# Patient Record
Sex: FEMALE | Race: WHITE | Employment: FULL TIME | URBAN - METROPOLITAN AREA
[De-identification: names, ages, dates, MRNs, and addresses within clinical notes are randomized per-mention and may not be internally consistent; named-entity substitution may affect disease eponyms.]

---

## 2020-05-06 ENCOUNTER — APPOINTMENT (OUTPATIENT)
Dept: CT IMAGING | Age: 40
End: 2020-05-06
Attending: FAMILY MEDICINE
Payer: COMMERCIAL

## 2020-05-06 ENCOUNTER — HOSPITAL ENCOUNTER (OUTPATIENT)
Age: 40
Discharge: HOME | End: 2020-05-06
Attending: FAMILY MEDICINE
Payer: COMMERCIAL

## 2020-05-06 VITALS
DIASTOLIC BLOOD PRESSURE: 77 MMHG | TEMPERATURE: 98 F | RESPIRATION RATE: 16 BRPM | SYSTOLIC BLOOD PRESSURE: 123 MMHG | HEART RATE: 68 BPM | OXYGEN SATURATION: 100 %

## 2020-05-06 DIAGNOSIS — R10.9 ABDOMINAL CRAMPING: Primary | ICD-10-CM

## 2020-05-06 PROCEDURE — 81025 URINE PREGNANCY TEST: CPT | Performed by: FAMILY MEDICINE

## 2020-05-06 PROCEDURE — 81002 URINALYSIS NONAUTO W/O SCOPE: CPT | Performed by: FAMILY MEDICINE

## 2020-05-06 PROCEDURE — 83690 ASSAY OF LIPASE: CPT | Performed by: FAMILY MEDICINE

## 2020-05-06 PROCEDURE — 85025 COMPLETE CBC W/AUTO DIFF WBC: CPT | Performed by: FAMILY MEDICINE

## 2020-05-06 PROCEDURE — 87086 URINE CULTURE/COLONY COUNT: CPT | Performed by: FAMILY MEDICINE

## 2020-05-06 PROCEDURE — 80053 COMPREHEN METABOLIC PANEL: CPT | Performed by: FAMILY MEDICINE

## 2020-05-06 PROCEDURE — 99203 OFFICE O/P NEW LOW 30 MIN: CPT

## 2020-05-06 PROCEDURE — 80047 BASIC METABLC PNL IONIZED CA: CPT

## 2020-05-06 PROCEDURE — 74176 CT ABD & PELVIS W/O CONTRAST: CPT | Performed by: FAMILY MEDICINE

## 2020-05-06 PROCEDURE — 99204 OFFICE O/P NEW MOD 45 MIN: CPT

## 2020-05-06 PROCEDURE — 96360 HYDRATION IV INFUSION INIT: CPT

## 2020-05-06 RX ORDER — SODIUM CHLORIDE 9 MG/ML
1000 INJECTION, SOLUTION INTRAVENOUS ONCE
Status: COMPLETED | OUTPATIENT
Start: 2020-05-06 | End: 2020-05-06

## 2020-05-06 RX ORDER — DICYCLOMINE HCL 20 MG
20 TABLET ORAL
Qty: 20 TABLET | Refills: 0 | Status: SHIPPED | OUTPATIENT
Start: 2020-05-06 | End: 2020-05-11

## 2020-05-06 RX ORDER — OMEPRAZOLE 20 MG/1
20 CAPSULE, DELAYED RELEASE ORAL
COMMUNITY

## 2020-05-06 RX ORDER — DICYCLOMINE HCL 20 MG
20 TABLET ORAL ONCE
Status: COMPLETED | OUTPATIENT
Start: 2020-05-06 | End: 2020-05-06

## 2020-05-06 NOTE — ED PROVIDER NOTES
Case was signed out to me with CT scan pending. Patient stable with normal CBC and istat. IV fluids were started and I met the patient to inform of her CT results - requested for some pain relief. Diffusely tenderness abdomen, no point tenderness.

## 2020-05-06 NOTE — ED INITIAL ASSESSMENT (HPI)
4 days of bright red blood in urine with urgency and frequency - lower abdominal burning pain. Ended 4 days ago. Then developed upper abdominal cramping and low back pain. Chills.

## 2020-05-07 NOTE — ED PROVIDER NOTES
Patient Seen in: THE [facility] [place]      History   Patient presents with:  Abdominal Pain  Urinary Symptoms    Stated Complaint: UMBILICAL/LOWER ABD PAIN WITH FATIGUE X 2 WEEKS    HPI    42-year-old female with history of migraines, hyp well-developed. Cardiovascular:      Rate and Rhythm: Normal rate and regular rhythm. Heart sounds: Normal heart sounds. Pulmonary:      Effort: Pulmonary effort is normal.      Breath sounds: Normal breath sounds.    Abdominal:      General: There Prescribed:  Discharge Medication List as of 5/6/2020  4:59 PM    START taking these medications    Dicyclomine HCl 20 MG Oral Tab  Take 1 tablet (20 mg total) by mouth 4 (four) times daily before meals and nightly for 5 days. , Normal, Disp-20 tablet, R-0

## 2020-05-08 ENCOUNTER — TELEPHONE (OUTPATIENT)
Dept: URGENT CARE | Age: 40
End: 2020-05-08

## 2020-05-08 NOTE — ED NOTES
Called to patient, message left to voicemail to call [facility] back.   (this is to notify of final urine culture and Comp Metabolic panel results.)

## 2025-07-27 ENCOUNTER — APPOINTMENT (OUTPATIENT)
Dept: CT IMAGING | Age: 45
End: 2025-07-27
Attending: EMERGENCY MEDICINE

## 2025-07-27 ENCOUNTER — HOSPITAL ENCOUNTER (EMERGENCY)
Age: 45
Discharge: HOME | End: 2025-07-28
Attending: EMERGENCY MEDICINE

## 2025-07-27 VITALS
HEIGHT: 64 IN | TEMPERATURE: 99 F | BODY MASS INDEX: 43.28 KG/M2 | OXYGEN SATURATION: 99 % | HEART RATE: 85 BPM | WEIGHT: 253.5 LBS | RESPIRATION RATE: 16 BRPM | SYSTOLIC BLOOD PRESSURE: 143 MMHG | DIASTOLIC BLOOD PRESSURE: 88 MMHG

## 2025-07-27 DIAGNOSIS — R10.30 LOWER ABDOMINAL PAIN: Primary | ICD-10-CM

## 2025-07-27 DIAGNOSIS — K08.89 PAIN, DENTAL: ICD-10-CM

## 2025-07-27 LAB
ALBUMIN SERPL-MCNC: 4.3 G/DL (ref 3.2–4.8)
ALBUMIN/GLOB SERPL: 1.4 (ref 1–2)
ALP LIVER SERPL-CCNC: 119 U/L (ref 39–100)
ALT SERPL-CCNC: 17 U/L (ref 10–49)
ANION GAP SERPL CALC-SCNC: 7 MMOL/L (ref 0–18)
AST SERPL-CCNC: 14 U/L (ref ?–34)
B-HCG UR QL: NEGATIVE
BASOPHILS # BLD AUTO: 0.09 X10(3) UL (ref 0–0.2)
BASOPHILS NFR BLD AUTO: 0.8 %
BILIRUB SERPL-MCNC: 0.3 MG/DL (ref 0.3–1.2)
BILIRUB UR QL STRIP.AUTO: NEGATIVE
BUN BLD-MCNC: 13 MG/DL (ref 9–23)
CALCIUM BLD-MCNC: 8.9 MG/DL (ref 8.7–10.6)
CHLORIDE SERPL-SCNC: 108 MMOL/L (ref 98–112)
CO2 SERPL-SCNC: 24 MMOL/L (ref 21–32)
COLOR UR AUTO: YELLOW
CREAT BLD-MCNC: 0.96 MG/DL (ref 0.55–1.02)
EGFRCR SERPLBLD CKD-EPI 2021: 73 ML/MIN/1.73M2 (ref 60–?)
EOSINOPHIL # BLD AUTO: 0.37 X10(3) UL (ref 0–0.7)
EOSINOPHIL NFR BLD AUTO: 3.2 %
ERYTHROCYTE [DISTWIDTH] IN BLOOD BY AUTOMATED COUNT: 14.2 %
GLOBULIN PLAS-MCNC: 3 G/DL (ref 2–3.5)
GLUCOSE BLD-MCNC: 93 MG/DL (ref 70–99)
GLUCOSE UR STRIP.AUTO-MCNC: NEGATIVE MG/DL
HCT VFR BLD AUTO: 43.4 % (ref 35–48)
HGB BLD-MCNC: 14.4 G/DL (ref 12–16)
IMM GRANULOCYTES # BLD AUTO: 0.05 X10(3) UL (ref 0–1)
IMM GRANULOCYTES NFR BLD: 0.4 %
KETONES UR STRIP.AUTO-MCNC: NEGATIVE MG/DL
LEUKOCYTE ESTERASE UR QL STRIP.AUTO: NEGATIVE
LIPASE SERPL-CCNC: 33 U/L (ref 12–53)
LYMPHOCYTES # BLD AUTO: 2.61 X10(3) UL (ref 1–4)
LYMPHOCYTES NFR BLD AUTO: 22.4 %
MCH RBC QN AUTO: 31.6 PG (ref 26–34)
MCHC RBC AUTO-ENTMCNC: 33.2 G/DL (ref 31–37)
MCV RBC AUTO: 95.4 FL (ref 80–100)
MONOCYTES # BLD AUTO: 0.79 X10(3) UL (ref 0.1–1)
MONOCYTES NFR BLD AUTO: 6.8 %
NEUTROPHILS # BLD AUTO: 7.76 X10 (3) UL (ref 1.5–7.7)
NEUTROPHILS # BLD AUTO: 7.76 X10(3) UL (ref 1.5–7.7)
NEUTROPHILS NFR BLD AUTO: 66.4 %
NITRITE UR QL STRIP.AUTO: NEGATIVE
OSMOLALITY SERPL CALC.SUM OF ELEC: 288 MOSM/KG (ref 275–295)
PH UR STRIP.AUTO: 5.5 (ref 5–8)
PLATELET # BLD AUTO: 250 10(3)UL (ref 150–450)
POTASSIUM SERPL-SCNC: 3.9 MMOL/L (ref 3.5–5.1)
PROT SERPL-MCNC: 7.3 G/DL (ref 5.7–8.2)
RBC # BLD AUTO: 4.55 X10(6)UL (ref 3.8–5.3)
RBC #/AREA URNS AUTO: >10 /HPF
SODIUM SERPL-SCNC: 139 MMOL/L (ref 136–145)
SP GR UR STRIP.AUTO: >=1.03 (ref 1–1.03)
UROBILINOGEN UR STRIP.AUTO-MCNC: 0.2 MG/DL
WBC # BLD AUTO: 11.7 X10(3) UL (ref 4–11)

## 2025-07-27 PROCEDURE — 83690 ASSAY OF LIPASE: CPT | Performed by: EMERGENCY MEDICINE

## 2025-07-27 PROCEDURE — 74176 CT ABD & PELVIS W/O CONTRAST: CPT | Performed by: EMERGENCY MEDICINE

## 2025-07-27 PROCEDURE — 81015 MICROSCOPIC EXAM OF URINE: CPT | Performed by: EMERGENCY MEDICINE

## 2025-07-27 PROCEDURE — 99284 EMERGENCY DEPT VISIT MOD MDM: CPT

## 2025-07-27 PROCEDURE — 81001 URINALYSIS AUTO W/SCOPE: CPT | Performed by: EMERGENCY MEDICINE

## 2025-07-27 PROCEDURE — 96375 TX/PRO/DX INJ NEW DRUG ADDON: CPT

## 2025-07-27 PROCEDURE — 93005 ELECTROCARDIOGRAM TRACING: CPT

## 2025-07-27 PROCEDURE — 93010 ELECTROCARDIOGRAM REPORT: CPT

## 2025-07-27 PROCEDURE — 80053 COMPREHEN METABOLIC PANEL: CPT | Performed by: EMERGENCY MEDICINE

## 2025-07-27 PROCEDURE — 81025 URINE PREGNANCY TEST: CPT

## 2025-07-27 PROCEDURE — 96361 HYDRATE IV INFUSION ADD-ON: CPT

## 2025-07-27 PROCEDURE — 96374 THER/PROPH/DIAG INJ IV PUSH: CPT

## 2025-07-27 PROCEDURE — 99285 EMERGENCY DEPT VISIT HI MDM: CPT

## 2025-07-27 PROCEDURE — 85025 COMPLETE CBC W/AUTO DIFF WBC: CPT | Performed by: EMERGENCY MEDICINE

## 2025-07-27 RX ORDER — AMOXICILLIN 500 MG/1
500 CAPSULE ORAL ONCE
Status: COMPLETED | OUTPATIENT
Start: 2025-07-27 | End: 2025-07-28

## 2025-07-27 RX ORDER — ONDANSETRON 2 MG/ML
4 INJECTION INTRAMUSCULAR; INTRAVENOUS ONCE
Status: COMPLETED | OUTPATIENT
Start: 2025-07-27 | End: 2025-07-27

## 2025-07-27 RX ORDER — ONDANSETRON 4 MG/1
4 TABLET, ORALLY DISINTEGRATING ORAL EVERY 8 HOURS PRN
Qty: 10 TABLET | Refills: 0 | Status: SHIPPED | OUTPATIENT
Start: 2025-07-27 | End: 2025-08-06

## 2025-07-27 RX ORDER — IBUPROFEN 600 MG/1
600 TABLET, FILM COATED ORAL EVERY 8 HOURS PRN
Qty: 30 TABLET | Refills: 0 | Status: SHIPPED | OUTPATIENT
Start: 2025-07-27

## 2025-07-27 RX ORDER — KETOROLAC TROMETHAMINE 15 MG/ML
15 INJECTION, SOLUTION INTRAMUSCULAR; INTRAVENOUS ONCE
Status: COMPLETED | OUTPATIENT
Start: 2025-07-27 | End: 2025-07-27

## 2025-07-27 RX ORDER — AMOXICILLIN 500 MG/1
500 TABLET, FILM COATED ORAL 3 TIMES DAILY
Qty: 30 TABLET | Refills: 0 | Status: SHIPPED | OUTPATIENT
Start: 2025-07-27 | End: 2025-08-06

## 2025-07-27 RX ORDER — LEVOTHYROXINE SODIUM 100 UG/1
100 TABLET ORAL
COMMUNITY
Start: 2024-11-02

## 2025-07-28 ENCOUNTER — TELEPHONE (OUTPATIENT)
Dept: OBGYN CLINIC | Facility: CLINIC | Age: 45
End: 2025-07-28

## 2025-07-28 LAB
ATRIAL RATE: 79 BPM
P AXIS: 57 DEGREES
P-R INTERVAL: 132 MS
Q-T INTERVAL: 420 MS
QRS DURATION: 84 MS
QTC CALCULATION (BEZET): 481 MS
R AXIS: 10 DEGREES
T AXIS: 18 DEGREES
VENTRICULAR RATE: 79 BPM

## 2025-07-28 NOTE — ED INITIAL ASSESSMENT (HPI)
Pt in er for evaluation with c/o pain to her right lower abdomin with diarrhea, and swelling, pain to her jaw

## 2025-07-28 NOTE — ED PROVIDER NOTES
Patient Seen in: ward Emergency Department In [place]        History  Chief Complaint   Patient presents with    Abdomen/Flank Pain    Dental Problem     Stated Complaint: Pt to ER for lower abd pain and vomiting x 2 days, and left dental pain x 24 ho*    Subjective:   Patient is a 45-year-old female presents emergency room for lower abdominal pain.  Patient reports lower abdominal pain both sides right greater than left for \"a while\" but worse over the last 3 days.  Patient reports nausea no periods currently but has felt feverish on Friday.  Patient been taking Tylenol for dental injury.  Patient reports last Tylenol was at 6 PM she denies any urinary symptoms she does not know if she could be pregnant.  Patient reports that she had an abnormal Pap smear about 7 months ago down in [place] recently moved to the area and has not followed up with OB/GYN regarding that.  I will be happy to give her a referral for OB/GYN for follow-up    The history is provided by the patient.                     Objective:     Past Medical History:    Hypothyroid    Migraines              Past Surgical History:   Procedure Laterality Date          1995; 2010    Cholecystectomy      2010                Social History     Socioeconomic History    Marital status:  (Not Legally)   Tobacco Use    Smoking status: Light Smoker     Types: Cigarettes    Smokeless tobacco: Never    Tobacco comments:     5-6 cigarettes per days   Vaping Use    Vaping status: Never Used   Substance and Sexual Activity    Alcohol use: No     Alcohol/week: 0.0 standard drinks of alcohol    Drug use: No   Other Topics Concern    Caffeine Concern No     Comment: occasional coffee    Exercise Yes     Comment: walking/stretching     Social Drivers of Health      [facility]                                Physical Exam    ED Triage Vitals [25 2238]   /88   Pulse 85   Resp 16   Temp 99 °F (37.2  °C)   Temp src Oral   SpO2 99 %   O2 Device None (Room air)       Current Vitals:   Vital Signs  BP: 143/88  Pulse: 85  Resp: 16  Temp: 99 °F (37.2 °C)  Temp src: Oral    Oxygen Therapy  SpO2: 99 %  O2 Device: None (Room air)            Physical Exam  Vitals and nursing note reviewed.   Constitutional:       General: She is not in acute distress.     Appearance: She is well-developed. She is obese. She is not toxic-appearing.   HENT:      Head: Normocephalic and atraumatic.   Eyes:      Extraocular Movements: Extraocular movements intact.      Pupils: Pupils are equal, round, and reactive to light.   Cardiovascular:      Rate and Rhythm: Normal rate and regular rhythm.      Heart sounds: Normal heart sounds.   Pulmonary:      Effort: Pulmonary effort is normal.      Breath sounds: Normal breath sounds.   Abdominal:      General: Bowel sounds are normal. There is no distension.      Palpations: Abdomen is soft.      Tenderness: There is abdominal tenderness in the right lower quadrant and left lower quadrant.   Skin:     General: Skin is warm.      Capillary Refill: Capillary refill takes less than 2 seconds.   Neurological:      General: No focal deficit present.      Mental Status: She is alert and oriented to person, place, and time.      Cranial Nerves: No cranial nerve deficit.   Psychiatric:         Mood and Affect: Mood normal.         Behavior: Behavior normal.                 ED Course  Labs Reviewed   CBC WITH DIFFERENTIAL WITH PLATELET - Abnormal; Notable for the following components:       Result Value    WBC 11.7 (*)     Neutrophil Absolute Prelim 7.76 (*)     Neutrophil Absolute 7.76 (*)     All other components within normal limits   COMP METABOLIC PANEL (14) - Abnormal; Notable for the following components:    Alkaline Phosphatase 119 (*)     All other components within normal limits   URINALYSIS WITH CULTURE REFLEX - Abnormal; Notable for the following components:    Clarity Urine Cloudy (*)      Blood Urine Large (*)     Protein Urine Trace (*)     All other components within normal limits   UA MICROSCOPIC ONLY, URINE - Abnormal; Notable for the following components:    RBC Urine >10 (*)     Bacteria Urine 1+ (*)     Squamous Epi. Cells Few (*)     All other components within normal limits   LIPASE - Normal   POCT PREGNANCY URINE - Normal   SCAN SLIDE          CT ABDOMEN+PELVIS(CPT=74176)  Result Date: 7/27/2025  PROCEDURE: CT ABDOMEN+PELVIS(CPT=74176) INDICATIONS: lower abd pain n/v COMPARISON: Noncontrast CT scan abdomen pelvis, [facility] 5/6/2020 TECHNIQUE: Multi-slice CT images of the abdomen and pelvis were performed without intravenous contrast material. Automated exposure control techniques for dose reduction were used, adjustment of the mA and/or kV were based on patient's size. Use of iterative reconstruction technique for dose reduction was used. Dose information is transmitted to the ACR (American College of Radiology) NRDR (National Radiology Data Registry) which includes the Dose Index Registry. FINDINGS: LIVER: There are 2 low-density foci within the liver are consistent with small cysts. One is seen along the posterior segment of the dome of the right lobe measuring 8 mm. The other seen along the anterior segment of the right lobe measuring 4 mm. BILIARY: Status post cholecystectomy. No biliary ductal dilatation. PANCREAS: Mild pancreatic atrophy/fatty infiltration. SPLEEN: No enlargement or focal lesion. KIDNEYS: Normal. No mass, obstruction, or calcification. ADRENALS: No mass or enlargement. AORTA/VASCULAR: Normal. No aneurysm or dissection. RETROPERITONEUM: No mass or enlarged adenopathy. BOWEL/MESENTERY: Normal. No visible mass, obstruction, or bowel wall thickening. ABDOMINAL WALL: Normal. No mass or hernia. URINARY BLADDER: Normal. No visible focal wall thickening, lesion, or calculus. PELVIC NODES: Normal. No adenopathy. PELVIC ORGANS: Normal. No visible mass. Pelvic organs  appropriate for patient age. BONES: Normal. No bony lesion or fracture. LUNG BASES: No visible pulmonary or pleural disease. OTHER: Negative.     CONCLUSION: There is no evidence of an acute intra-abdominal or pelvic process. Please see the body the report above for further, less significant details. Electronically Verified and Signed by Attending Radiologist: Murali Holley MD 2025 11:40 PM Workstation: [id number]            EKG shows a normal sinus rhythm with ventricular rate of 79 HI interval of 132 QRS of 84 QTc of 481 thanks is a 57/10/18          MDM     Social -negative tobacco, negative etoh, negative drugs, unsure if possibility of pregnancy  Family History-noncontributory  Past Medical History-hypothyroidism, , gallbladder removal, migraine    Differential diagnosis before testing included appendicitis, gastroenteritis, diverticulitis, bowel obstruction, UTI, kidney stone    Co-morbidities that add to the complexity of management include: Patient reticently ready [place] located from [place] does not have an OB/GYN you to follow-up but will give name for 1 for follow-up.  Patient has a contrast dye allergy will do CT scan without contrast    Testing ordered during this visit included CT scan urinalysis baseline labs pregnancy test    Radiographic images  I personally reviewed the radiographs and my individual interpretation shows no acute process  I also reviewed the official reports that showed CT ABDOMEN+PELVIS(CPT=74176)  Result Date: 2025  PROCEDURE: CT ABDOMEN+PELVIS(CPT=74176) INDICATIONS: lower abd pain n/v COMPARISON: Noncontrast CT scan abdomen pelvis, [facility] 2020 TECHNIQUE: Multi-slice CT images of the abdomen and pelvis were performed without intravenous contrast material. Automated exposure control techniques for dose reduction were used, adjustment of the mA and/or kV were based on patient's size. Use of iterative reconstruction technique for dose reduction was  used. Dose information is transmitted to the ACR (American College of Radiology) NRDR (National Radiology Data Registry) which includes the Dose Index Registry. FINDINGS: LIVER: There are 2 low-density foci within the liver are consistent with small cysts. One is seen along the posterior segment of the dome of the right lobe measuring 8 mm. The other seen along the anterior segment of the right lobe measuring 4 mm. BILIARY: Status post cholecystectomy. No biliary ductal dilatation. PANCREAS: Mild pancreatic atrophy/fatty infiltration. SPLEEN: No enlargement or focal lesion. KIDNEYS: Normal. No mass, obstruction, or calcification. ADRENALS: No mass or enlargement. AORTA/VASCULAR: Normal. No aneurysm or dissection. RETROPERITONEUM: No mass or enlarged adenopathy. BOWEL/MESENTERY: Normal. No visible mass, obstruction, or bowel wall thickening. ABDOMINAL WALL: Normal. No mass or hernia. URINARY BLADDER: Normal. No visible focal wall thickening, lesion, or calculus. PELVIC NODES: Normal. No adenopathy. PELVIC ORGANS: Normal. No visible mass. Pelvic organs appropriate for patient age. BONES: Normal. No bony lesion or fracture. LUNG BASES: No visible pulmonary or pleural disease. OTHER: Negative.     CONCLUSION: There is no evidence of an acute intra-abdominal or pelvic process. Please see the body the report above for further, less significant details. Electronically Verified and Signed by Attending Radiologist: Murali Holley MD 7/27/2025 11:40 PM Workstation: [id number]        External chart review showed review of Care Everywhere in epic system shows no related comorbidities to current presentation    History obtained by an independent source included from patient    Discussion of management with patient    Social determinants of health that affect care include patient is new to the area in the last 6 months will recommend follow-up with OB/GYN regarding her abnormal Pap smear that was completed 6 months ago that she  was post to have follow-up with in [place]      Medications Provided: Zofran Toradol IV fluids    Course of Events during Emergency Room Visit include 45-year-old female with generalized lower abdominal pain will get pregnancy test if negative will get CT scan abdomen based on labs urinalysis will give Toradol and Zofran and IV fluids.  Patient to follow-up with OB/GYN.  Patient also had a complaint of dental pain would recommend the patient follows up with her dentist.  Will gladly put the patient on anti-inflammatories and a course of antibiotics to help with symptoms.  Patient to follow-up with her OB/GYN as well      Patient informed to follow-up with her dentist and OB/GYN.  CT scan shows no acute process.  Lab work is unremarkable other than mild hematuria.  Patient to follow-up with her OB/GYN regarding this.  She denies Mary Della.  Is currently going on but may be starting soon also.  Recommend Tylenol Motrin as needed for pain control started on amoxicillin Zofran for nausea.    Disposition:          Discharge  I have discussed with the patient the results of test, differential diagnosis, treatment plan, warning signs and symptoms which should prompt immediate return.  They expressed understanding of these instructions and agrees to the following plan provided.  They were given written discharge instructions and agrees to return for any concerns and voiced understanding and all questions were answered.           Medical Decision Making      Disposition and Plan     Clinical Impression:  1. Lower abdominal pain    2. Pain, dental         Disposition:  Discharge  7/27/2025 11:56 pm    Follow-up:  Nelsy Carrion MD  [address] [id number] [phone]    Schedule an appointment as soon as possible for a visit      Vilma Arevalo DO  [address] [place] [id number] [phone]    Schedule an appointment as soon as possible for a visit            Medications  Prescribed:  Current Discharge Medication List        START taking these medications    Details   amoxicillin 500 MG Oral Tab Take 1 tablet (500 mg total) by mouth 3 (three) times daily for 10 days.  Qty: 30 tablet, Refills: 0      ondansetron 4 MG Oral Tablet Dispersible Take 1 tablet (4 mg total) by mouth every 8 (eight) hours as needed for Nausea (vomiting).  Qty: 10 tablet, Refills: 0      !! ibuprofen 600 MG Oral Tab Take 1 tablet (600 mg total) by mouth every 8 (eight) hours as needed for Pain or Fever.  Qty: 30 tablet, Refills: 0       !! - Potential duplicate medications found. Please discuss with provider.                Supplementary Documentation:

## 2025-08-27 ENCOUNTER — TELEPHONE (OUTPATIENT)
Dept: OBGYN CLINIC | Facility: CLINIC | Age: 45
End: 2025-08-27

## (undated) NOTE — LETTER
Date & Time: 7/28/2025, 12:02 AM  Patient: Millie Deluca  Encounter Provider(s):    Mary Hull DO       To Whom It May Concern:    Millie Deluca was seen and treated in our department on 7/27/2025. She states he was he was from work for 3 days.    If you have any questions or concerns, please do not hesitate to call.        _____________________________  Physician/APC Signature